# Patient Record
Sex: MALE | Race: WHITE | NOT HISPANIC OR LATINO | URBAN - METROPOLITAN AREA
[De-identification: names, ages, dates, MRNs, and addresses within clinical notes are randomized per-mention and may not be internally consistent; named-entity substitution may affect disease eponyms.]

---

## 2020-07-09 ENCOUNTER — OFFICE VISIT (OUTPATIENT)
Dept: URBAN - METROPOLITAN AREA CLINIC 53 | Facility: CLINIC | Age: 25
End: 2020-07-09

## 2020-09-24 ENCOUNTER — OFFICE VISIT (OUTPATIENT)
Dept: URBAN - NONMETROPOLITAN AREA CLINIC 2 | Facility: CLINIC | Age: 25
End: 2020-09-24

## 2020-10-08 ENCOUNTER — TELEPHONE ENCOUNTER (OUTPATIENT)
Dept: URBAN - NONMETROPOLITAN AREA CLINIC 2 | Facility: CLINIC | Age: 25
End: 2020-10-08

## 2020-10-08 RX ORDER — ACETAMINOPHEN 650 MG
2 TABLETS AS NEEDED TABLET, EXTENDED RELEASE ORAL
Qty: 10 | Refills: 0 | OUTPATIENT
Start: 2020-10-13 | End: 2020-10-14

## 2020-10-08 RX ORDER — INFLIXIMAB 100 MG/10ML
AS DIRECTED INJECTION, POWDER, LYOPHILIZED, FOR SOLUTION INTRAVENOUS
Qty: 1 | Refills: 0 | OUTPATIENT
Start: 2020-10-13 | End: 2020-10-14

## 2020-10-08 RX ORDER — DIPHENHYDRAMINE HCL 2 %
AS DIRECTED CREAM (GRAM) TOPICAL
Qty: 30 | Refills: 0 | OUTPATIENT
Start: 2020-10-13 | End: 2020-11-12

## 2020-10-15 ENCOUNTER — LAB OUTSIDE AN ENCOUNTER (OUTPATIENT)
Dept: URBAN - NONMETROPOLITAN AREA CLINIC 2 | Facility: CLINIC | Age: 25
End: 2020-10-15

## 2022-08-04 ENCOUNTER — OFFICE VISIT (OUTPATIENT)
Dept: URBAN - METROPOLITAN AREA TELEHEALTH 2 | Facility: TELEHEALTH | Age: 27
End: 2022-08-04

## 2022-08-04 VITALS — HEIGHT: 72 IN

## 2022-08-04 RX ORDER — INFLIXIMAB 100 MG/10ML
INJECTION, POWDER, LYOPHILIZED, FOR SOLUTION INTRAVENOUS
Qty: 0 | Refills: 0 | Status: ACTIVE | COMMUNITY
Start: 1900-01-01

## 2022-08-04 NOTE — HPI-TODAY'S VISIT:
5/3/2019 (Dr. Sharon Colunga):  Imtiaz presents for follow up of Crohn's disease with involvement of the terminal ileum and sigmoid colon in 2015 on biopsies with granulmas. He is stable on remicade 5 mg/kg every 8 weeks with no significant flares. He is due for labs. He has had no side effects of the medication. He has only had a few days of diarrhea that may be food related since his visit last year.  Today he is doing well. MB  3/26/20 (Dr. Sharon Colunga): Imtiaz presents for follow up of Crohn's disease. Since his last visit he has been doing great on remicade every 8 weeks with no flares. His bowels are moving regularly. He has recently picked up an URI after spring break and is pending COI

## 2023-05-26 ENCOUNTER — OFFICE VISIT (OUTPATIENT)
Dept: URBAN - METROPOLITAN AREA CLINIC 70 | Facility: CLINIC | Age: 28
End: 2023-05-26

## 2023-05-29 PROBLEM — 71833008: Status: ACTIVE | Noted: 2023-05-29

## 2023-05-30 ENCOUNTER — CLAIMS CREATED FROM THE CLAIM WINDOW (OUTPATIENT)
Dept: URBAN - METROPOLITAN AREA CLINIC 88 | Facility: CLINIC | Age: 28
End: 2023-05-30
Payer: COMMERCIAL

## 2023-05-30 ENCOUNTER — WEB ENCOUNTER (OUTPATIENT)
Dept: URBAN - METROPOLITAN AREA CLINIC 88 | Facility: CLINIC | Age: 28
End: 2023-05-30

## 2023-05-30 ENCOUNTER — OFFICE VISIT (OUTPATIENT)
Dept: URBAN - METROPOLITAN AREA CLINIC 88 | Facility: CLINIC | Age: 28
End: 2023-05-30

## 2023-05-30 ENCOUNTER — LAB OUTSIDE AN ENCOUNTER (OUTPATIENT)
Dept: URBAN - METROPOLITAN AREA CLINIC 88 | Facility: CLINIC | Age: 28
End: 2023-05-30

## 2023-05-30 VITALS
HEART RATE: 47 BPM | TEMPERATURE: 98.1 F | HEIGHT: 72 IN | WEIGHT: 166.8 LBS | DIASTOLIC BLOOD PRESSURE: 67 MMHG | SYSTOLIC BLOOD PRESSURE: 110 MMHG | BODY MASS INDEX: 22.59 KG/M2

## 2023-05-30 DIAGNOSIS — K59.01 CONSTIPATION: ICD-10-CM

## 2023-05-30 DIAGNOSIS — K50.80 CROHN'S DISEASE OF BOTH SMALL AND LARGE INTESTINE WITHOUT COMPLICATION: ICD-10-CM

## 2023-05-30 PROBLEM — 35298007: Status: ACTIVE | Noted: 2023-05-30

## 2023-05-30 PROCEDURE — 99204 OFFICE O/P NEW MOD 45 MIN: CPT | Performed by: NURSE PRACTITIONER

## 2023-05-30 RX ORDER — INFLIXIMAB 100 MG/10ML
INJECTION, POWDER, LYOPHILIZED, FOR SOLUTION INTRAVENOUS
Qty: 0 | Refills: 0 | Status: ACTIVE | COMMUNITY
Start: 1900-01-01

## 2023-05-30 RX ORDER — POLYETHYLENE GLYCOL 3350 17 G/DOSE
MIX ONE SCOOP IN AT LEAST 8 OZ OF WATER, TEA, COFFEE, JUICE POWDER (GRAM) ORAL
Qty: 510 GRAMS | Refills: 5 | OUTPATIENT
Start: 2023-05-30 | End: 2023-11-25

## 2023-05-30 NOTE — HPI-TODAY'S VISIT:
Patient with hx of small and large bowel Crohn's disease presents today to re-establish GI care with our practice.   Last evaluated by our group in 2020 (Dr. Winston Todd).  Was followed by Dr. Jose Pfeiffer in [facility] until recently due to insurance change.     Has been maintained on Remicade infusion at 10 mg/kg every 8 weeks for the last 6-7 years.  His last infusion was on 04/28/2023.  Over the last year has noticed intermittent mini-flares.  Voices mid-abdominal cramping, increased constipation and lower back pain.  Initially, flares would last for few days.  He would change his diet and symptoms would gradually resolve.   Over the last 2-3 weeks, noticed increased abdominal pain and lower back pain that lasted for several weeks.   Pain gradually resolved and currently rates pain as 1 out of 10.   Typically, defecation occurs once every flare.  During his flares, defecation may not occur for 2-3 days.  Fails to experience a complete sense of evacuation with every BM.   Father also presents who state that patient has been noncompliant with Remicade infusion for the last 1-2 years.  Patient admits to missing at least 6 months or more of infusion.  Has been consistently receiving his infusion for the last 8-10 months.   Also desiring clearance to proceed with surgical repair of right hydrocele.  Previous gastroenterologist did not clear patient due to concern of active IBD.   Last colonoscopy was in April 2023:  Mild erythema in sigmoid colon (benign), normal TI, small IH and poor prep.       Crohn's history:  Diagnosed with small and large bowel Crohn's as a freshman in college (2015).  Presented with complaints of nausea, loss of appetite, and weight loss.  Underwent colonoscopy and EGD in [place], [place].  Initially started on tapering steroids and oral mesalamine with little change in symptoms after 6 months of use.  Has also tried sulfasalazine and Imuran in the past.  Later switched to Remicade infusion at current rate of 10 mg/kg.  Last EGD in August 2015:  normal esophagus, moderate gastritis (neg HP), duodenal lesions (negative celiac, +chronic duodenitis)

## 2023-05-30 NOTE — HPI-OTHER HISTORIES
--------------------------------------------------------------------------------------------------- Last office note by Dr. Winston Todd 03/26/2020: 3/26/20 (Dr. Sharon Colunga): Imtiaz presents for follow up of Crohn's disease. Since his last visit he has been doing great on remicade every 8 weeks with no flares. His bowels are moving regularly. He has recently picked up an URI after spring break and is pending COI. 5/3/2019 (Dr. Sharon Colunga):  Imtiaz presents for follow up of Crohn's disease with involvement of the terminal ileum and sigmoid colon in 2015 on biopsies with granulmas. He is stable on remicade 5 mg/kg every 8 weeks with no significant flares. He is due for labs. He has had no side effects of the medication. He has only had a few days of diarrhea that may be food related since his visit last year.  Today he is doing well. MB

## 2023-06-08 ENCOUNTER — OFFICE VISIT (OUTPATIENT)
Dept: URBAN - METROPOLITAN AREA SURGERY CENTER 24 | Facility: SURGERY CENTER | Age: 28
End: 2023-06-08

## 2023-06-08 ENCOUNTER — CLAIMS CREATED FROM THE CLAIM WINDOW (OUTPATIENT)
Dept: URBAN - METROPOLITAN AREA SURGERY CENTER 24 | Facility: SURGERY CENTER | Age: 28
End: 2023-06-08
Payer: COMMERCIAL

## 2023-06-08 ENCOUNTER — CLAIMS CREATED FROM THE CLAIM WINDOW (OUTPATIENT)
Dept: URBAN - METROPOLITAN AREA CLINIC 4 | Facility: CLINIC | Age: 28
End: 2023-06-08
Payer: COMMERCIAL

## 2023-06-08 ENCOUNTER — TELEPHONE ENCOUNTER (OUTPATIENT)
Dept: URBAN - METROPOLITAN AREA CLINIC 70 | Facility: CLINIC | Age: 28
End: 2023-06-08

## 2023-06-08 DIAGNOSIS — K21.9 ACID REFLUX: ICD-10-CM

## 2023-06-08 DIAGNOSIS — K50.80 CROHN'S COLITIS: ICD-10-CM

## 2023-06-08 DIAGNOSIS — K31.89 OTHER DISEASES OF STOMACH AND DUODENUM: ICD-10-CM

## 2023-06-08 DIAGNOSIS — K31.89 ACQUIRED DEFORMITY OF DUODENUM: ICD-10-CM

## 2023-06-08 PROCEDURE — 88312 SPECIAL STAINS GROUP 1: CPT | Performed by: PATHOLOGY

## 2023-06-08 PROCEDURE — 88305 TISSUE EXAM BY PATHOLOGIST: CPT | Performed by: PATHOLOGY

## 2023-06-08 PROCEDURE — G8907 PT DOC NO EVENTS ON DISCHARG: HCPCS | Performed by: INTERNAL MEDICINE

## 2023-06-08 PROCEDURE — 43239 EGD BIOPSY SINGLE/MULTIPLE: CPT | Performed by: INTERNAL MEDICINE

## 2023-06-08 PROCEDURE — 45330 DIAGNOSTIC SIGMOIDOSCOPY: CPT | Performed by: INTERNAL MEDICINE

## 2023-06-08 RX ORDER — POLYETHYLENE GLYCOL 3350 17 G/DOSE
MIX ONE SCOOP IN AT LEAST 8 OZ OF WATER, TEA, COFFEE, JUICE POWDER (GRAM) ORAL
Qty: 510 GRAMS | Refills: 5 | Status: ACTIVE | COMMUNITY
Start: 2023-05-30 | End: 2023-11-25

## 2023-06-08 RX ORDER — INFLIXIMAB 100 MG/10ML
INJECTION, POWDER, LYOPHILIZED, FOR SOLUTION INTRAVENOUS
Qty: 0 | Refills: 0 | Status: ACTIVE | COMMUNITY
Start: 1900-01-01

## 2023-06-08 RX ORDER — PANTOPRAZOLE SODIUM 40 MG/1
1 TABLET TABLET, DELAYED RELEASE ORAL ONCE A DAY
Qty: 30 | OUTPATIENT
Start: 2023-06-08

## 2023-06-13 ENCOUNTER — CLAIMS CREATED FROM THE CLAIM WINDOW (OUTPATIENT)
Dept: URBAN - METROPOLITAN AREA CLINIC 4 | Facility: CLINIC | Age: 28
End: 2023-06-13
Payer: COMMERCIAL

## 2023-06-13 ENCOUNTER — OFFICE VISIT (OUTPATIENT)
Dept: URBAN - METROPOLITAN AREA SURGERY CENTER 24 | Facility: SURGERY CENTER | Age: 28
End: 2023-06-13
Payer: COMMERCIAL

## 2023-06-13 DIAGNOSIS — K50.80 CROHN'S COLITIS: ICD-10-CM

## 2023-06-13 DIAGNOSIS — K31.89 OTHER DISEASES OF STOMACH AND DUODENUM: ICD-10-CM

## 2023-06-13 DIAGNOSIS — K52.89 (LYMPHOCYTIC) MICROSCOPIC COLITIS: ICD-10-CM

## 2023-06-13 PROCEDURE — 45380 COLONOSCOPY AND BIOPSY: CPT | Performed by: INTERNAL MEDICINE

## 2023-06-13 PROCEDURE — G8907 PT DOC NO EVENTS ON DISCHARG: HCPCS | Performed by: INTERNAL MEDICINE

## 2023-06-13 PROCEDURE — 88305 TISSUE EXAM BY PATHOLOGIST: CPT | Performed by: PATHOLOGY

## 2023-06-16 ENCOUNTER — OFFICE VISIT (OUTPATIENT)
Dept: URBAN - METROPOLITAN AREA CLINIC 128 | Facility: CLINIC | Age: 28
End: 2023-06-16

## 2023-07-11 ENCOUNTER — LAB OUTSIDE AN ENCOUNTER (OUTPATIENT)
Dept: URBAN - METROPOLITAN AREA CLINIC 70 | Facility: CLINIC | Age: 28
End: 2023-07-11

## 2023-07-20 ENCOUNTER — TELEPHONE ENCOUNTER (OUTPATIENT)
Dept: URBAN - METROPOLITAN AREA CLINIC 70 | Facility: CLINIC | Age: 28
End: 2023-07-20

## 2023-07-20 ENCOUNTER — LAB OUTSIDE AN ENCOUNTER (OUTPATIENT)
Dept: URBAN - METROPOLITAN AREA CLINIC 70 | Facility: CLINIC | Age: 28
End: 2023-07-20

## 2023-07-20 LAB
ANTI-INFLIXIMAB ANTIBODY: 1874
C-REACTIVE PROTEIN, QUANT: 44
CALPROTECTIN, FECAL: 3040
INFLIXIMAB DRUG LEVEL: <0.4
Lab: (no result)
SEDIMENTATION RATE-WESTERGREN: 22

## 2023-08-07 ENCOUNTER — TELEPHONE ENCOUNTER (OUTPATIENT)
Dept: URBAN - METROPOLITAN AREA CLINIC 70 | Facility: CLINIC | Age: 28
End: 2023-08-07

## 2023-08-18 ENCOUNTER — OFFICE VISIT (OUTPATIENT)
Dept: URBAN - METROPOLITAN AREA CLINIC 88 | Facility: CLINIC | Age: 28
End: 2023-08-18

## 2023-08-28 ENCOUNTER — TELEPHONE ENCOUNTER (OUTPATIENT)
Dept: URBAN - METROPOLITAN AREA CLINIC 70 | Facility: CLINIC | Age: 28
End: 2023-08-28

## 2023-08-28 PROBLEM — 235595009: Status: ACTIVE | Noted: 2023-08-28

## 2023-08-28 RX ORDER — PREDNISONE 10 MG/1
TAKE 4 TABLETS ONCE A DAY X 14 DAYS, THEN 3 TABLETS A DAY X 7 DAYS, 2 TABLET A DAY X 7 DAYS, THEN 1 TABLET DAILY X 7 DAYS TABLET ORAL ONCE A DAY
Qty: 98 | Refills: 0 | OUTPATIENT
Start: 2023-08-28 | End: 2023-09-27

## 2023-08-28 RX ORDER — POLYETHYLENE GLYCOL 3350 17 G/DOSE
MIX ONE SCOOP IN AT LEAST 8 OZ OF WATER, TEA, COFFEE, JUICE POWDER (GRAM) ORAL
Qty: 510 GRAMS | Refills: 5 | Status: ACTIVE | COMMUNITY
Start: 2023-05-30 | End: 2023-11-25

## 2023-08-28 RX ORDER — PANTOPRAZOLE SODIUM 40 MG/1
1 TABLET TABLET, DELAYED RELEASE ORAL
Qty: 90 | Refills: 1 | OUTPATIENT
Start: 2023-08-28

## 2023-08-28 RX ORDER — INFLIXIMAB 100 MG/10ML
INJECTION, POWDER, LYOPHILIZED, FOR SOLUTION INTRAVENOUS
Qty: 0 | Refills: 0 | Status: ACTIVE | COMMUNITY
Start: 1900-01-01

## 2023-08-28 RX ORDER — PANTOPRAZOLE SODIUM 40 MG/1
1 TABLET TABLET, DELAYED RELEASE ORAL ONCE A DAY
Qty: 30 | Status: ACTIVE | COMMUNITY
Start: 2023-06-08

## 2023-08-28 RX ORDER — METRONIDAZOLE 500 MG/1
1 TABLET TABLET ORAL THREE TIMES A DAY
Qty: 30 TABLET | Refills: 0 | OUTPATIENT
Start: 2023-08-28 | End: 2023-09-07

## 2023-09-08 ENCOUNTER — OFFICE VISIT (OUTPATIENT)
Dept: URBAN - METROPOLITAN AREA CLINIC 88 | Facility: CLINIC | Age: 28
End: 2023-09-08
Payer: COMMERCIAL

## 2023-09-08 ENCOUNTER — LAB OUTSIDE AN ENCOUNTER (OUTPATIENT)
Dept: URBAN - METROPOLITAN AREA CLINIC 88 | Facility: CLINIC | Age: 28
End: 2023-09-08

## 2023-09-08 ENCOUNTER — WEB ENCOUNTER (OUTPATIENT)
Dept: URBAN - METROPOLITAN AREA CLINIC 88 | Facility: CLINIC | Age: 28
End: 2023-09-08

## 2023-09-08 VITALS
HEIGHT: 72 IN | OXYGEN SATURATION: 99 % | DIASTOLIC BLOOD PRESSURE: 78 MMHG | HEART RATE: 64 BPM | WEIGHT: 164 LBS | BODY MASS INDEX: 22.21 KG/M2 | TEMPERATURE: 97.9 F | SYSTOLIC BLOOD PRESSURE: 122 MMHG

## 2023-09-08 DIAGNOSIS — K50.80 CROHN'S DISEASE OF BOTH SMALL AND LARGE INTESTINE WITHOUT COMPLICATION: ICD-10-CM

## 2023-09-08 DIAGNOSIS — R19.7 DIARRHEA OF PRESUMED INFECTIOUS ORIGIN: ICD-10-CM

## 2023-09-08 DIAGNOSIS — K21.9 GASTROESOPHAGEAL REFLUX DISEASE, UNSPECIFIED WHETHER ESOPHAGITIS PRESENT: ICD-10-CM

## 2023-09-08 PROCEDURE — 99214 OFFICE O/P EST MOD 30 MIN: CPT | Performed by: NURSE PRACTITIONER

## 2023-09-08 RX ORDER — USTEKINUMAB 90 MG/ML
INJECT 1 PEN INJECTION, SOLUTION SUBCUTANEOUS
Qty: 1 | Refills: 6 | OUTPATIENT
Start: 2023-09-08

## 2023-09-08 RX ORDER — PREDNISONE 10 MG/1
TAKE 4 TABLETS ONCE A DAY X 14 DAYS, THEN 3 TABLETS A DAY X 7 DAYS, 2 TABLET A DAY X 7 DAYS, THEN 1 TABLET DAILY X 7 DAYS TABLET ORAL ONCE A DAY
Qty: 98 | Refills: 0 | Status: ACTIVE | COMMUNITY
Start: 2023-08-28 | End: 2023-09-27

## 2023-09-08 RX ORDER — INFLIXIMAB 100 MG/10ML
INJECTION, POWDER, LYOPHILIZED, FOR SOLUTION INTRAVENOUS
Qty: 0 | Refills: 0 | Status: DISCONTINUED | COMMUNITY
Start: 1900-01-01

## 2023-09-08 RX ORDER — PREDNISONE 10 MG/1
TAKE 4 TABLETS ONCE A DAY X 14 DAYS, THEN 3 TABLETS A DAY X 7 DAYS, 2 TABLET A DAY X 7 DAYS, THEN 1 TABLET DAILY X 7 DAYS TABLET ORAL ONCE A DAY
OUTPATIENT
Start: 2023-08-28

## 2023-09-08 RX ORDER — ACETAMINOPHEN 650 MG
2 TABLETS AS NEEDED TABLET, EXTENDED RELEASE ORAL
Qty: 6 TABLET | Refills: 0 | OUTPATIENT
Start: 2023-09-08 | End: 2023-09-09

## 2023-09-08 RX ORDER — PANTOPRAZOLE SODIUM 40 MG/1
1 TABLET TABLET, DELAYED RELEASE ORAL ONCE A DAY
Qty: 30 | Status: DISCONTINUED | COMMUNITY
Start: 2023-06-08

## 2023-09-08 RX ORDER — PANTOPRAZOLE SODIUM 40 MG/1
1 TABLET TABLET, DELAYED RELEASE ORAL
Qty: 90 | Refills: 1 | Status: ACTIVE | COMMUNITY
Start: 2023-08-28

## 2023-09-08 RX ORDER — PANTOPRAZOLE SODIUM 40 MG/1
1 TABLET TABLET, DELAYED RELEASE ORAL
Qty: 90 | Refills: 1 | OUTPATIENT

## 2023-09-08 RX ORDER — POLYETHYLENE GLYCOL 3350 17 G/DOSE
MIX ONE SCOOP IN AT LEAST 8 OZ OF WATER, TEA, COFFEE, JUICE POWDER (GRAM) ORAL
Qty: 510 GRAMS | Refills: 5 | Status: DISCONTINUED | COMMUNITY
Start: 2023-05-30 | End: 2023-11-25

## 2023-09-08 NOTE — HPI-OTHER HISTORIES
------------------------------------------------------------------------------------- Last office note 05/30/2023: Patient with hx of small and large bowel Crohn's disease presents today to re-establish GI care with our practice.   Last evaluated by our group in 2020 (Dr. Winston Todd).  Was followed by Dr. Jose Pfeiffer in [facility] until recently due to insurance change.     Has been maintained on Remicade infusion at 10 mg/kg every 8 weeks for the last 6-7 years.  His last infusion was on 04/28/2023.  Over the last year has noticed intermittent mini-flares.  Voices mid-abdominal cramping, increased constipation and lower back pain.  Initially, flares would last for few days.  He would change his diet and symptoms would gradually resolve.   Over the last 2-3 weeks, noticed increased abdominal pain and lower back pain that lasted for several weeks.   Pain gradually resolved and currently rates pain as 1 out of 10.   Typically, defecation occurs once every flare.  During his flares, defecation may not occur for 2-3 days.  Fails to experience a complete sense of evacuation with every BM.   Father also presents who state that patient has been noncompliant with Remicade infusion for the last 1-2 years.  Patient admits to missing at least 6 months or more of infusion.  Has been consistently receiving his infusion for the last 8-10 months.   Also desiring clearance to proceed with surgical repair of right hydrocele.  Previous gastroenterologist did not clear patient due to concern of active IBD.   Last colonoscopy was in April 2023:  Mild erythema in sigmoid colon (benign), normal TI, small IH and poor prep.       Crohn's history:  Diagnosed with small and large bowel Crohn's as a freshman in college (2015).  Presented with complaints of nausea, loss of appetite, and weight loss.  Underwent colonoscopy and EGD in [place], [place].  Initially started on tapering steroids and oral mesalamine with little change in symptoms after 6 months of use.  Has also tried sulfasalazine and Imuran in the past.  Later switched to Remicade infusion at current rate of 10 mg/kg.  Last EGD in August 2015:  normal esophagus, moderate gastritis (neg HP), duodenal lesions (negative celiac, +chronic duodenitis).

## 2023-09-08 NOTE — HPI-TODAY'S VISIT:
Patient presents today for follow up regarding Crohn's.  Labs reviewed and discussed with patient.   Currently on tapering dose of steroid.  Since starting this, voices improvement in stool consistency.  Defecation occurs 1-2 times a day.  Also reports decrease in abdominal pain and fatigue.  Patient's states last dose of Remicade infusion was in April 2023.    CTE on 08/18/2023:  active inflammation with mild narrowing of 3 cm segment of TI, active inflammation with multiple skip lesions in distal, mid and proximal ileum/distal jejunum without signs of high grade obstruction, large left and moderate right hydrocele. Previously followed by Dr. Jose Pfeiffer (gastroenterologist in [facility]) until recently due to insurance change.    Last colonoscopy was in April 2023:  Mild erythema in sigmoid colon (benign), normal TI, small IH and poor prep.    Desiring to proceed with alternative therapy to manage Crohn's disease.  Patient recently started a new job that may require remote vs traveling nationwide assignments.   Crohn's history: Diagnosed with small and large bowel Crohn's as a freshman in college (2015). Presented with complaints of nausea, loss of appetite, and weight loss. Underwent colonoscopy and EGD in [place], [place]. Initially started on tapering steroids and oral mesalamine with little change in symptoms after 6 months of use. Has also tried sulfasalazine and Imuran in the past. Later switched to Remicade infusion at current rate of 10 mg/kg. Last EGD in August 2015: normal esophagus, moderate gastritis (neg HP), duodenal lesions (negative celiac, +chronic duodenitis).

## 2023-09-11 ENCOUNTER — TELEPHONE ENCOUNTER (OUTPATIENT)
Dept: URBAN - METROPOLITAN AREA CLINIC 70 | Facility: CLINIC | Age: 28
End: 2023-09-11

## 2023-09-13 ENCOUNTER — TELEPHONE ENCOUNTER (OUTPATIENT)
Dept: URBAN - METROPOLITAN AREA CLINIC 70 | Facility: CLINIC | Age: 28
End: 2023-09-13

## 2023-09-20 ENCOUNTER — TELEPHONE ENCOUNTER (OUTPATIENT)
Dept: URBAN - METROPOLITAN AREA CLINIC 70 | Facility: CLINIC | Age: 28
End: 2023-09-20

## 2023-09-21 ENCOUNTER — LAB OUTSIDE AN ENCOUNTER (OUTPATIENT)
Dept: URBAN - METROPOLITAN AREA CLINIC 88 | Facility: CLINIC | Age: 28
End: 2023-09-21

## 2023-09-22 ENCOUNTER — TELEPHONE ENCOUNTER (OUTPATIENT)
Dept: URBAN - METROPOLITAN AREA CLINIC 117 | Facility: CLINIC | Age: 28
End: 2023-09-22

## 2023-10-16 ENCOUNTER — TELEPHONE ENCOUNTER (OUTPATIENT)
Dept: URBAN - METROPOLITAN AREA CLINIC 70 | Facility: CLINIC | Age: 28
End: 2023-10-16

## 2023-10-16 LAB
A/G RATIO: 1.5
ABSOLUTE BASOPHILS: 41
ABSOLUTE EOSINOPHILS: 12
ABSOLUTE LYMPHOCYTES: 1061
ABSOLUTE MONOCYTES: 539
ABSOLUTE NEUTROPHILS: 4147
ALBUMIN: 3.7
ALKALINE PHOSPHATASE: 54
ALT (SGPT): 5
AST (SGOT): 10
BASOPHILS: 0.7
BILIRUBIN, TOTAL: 0.4
BUN/CREATININE RATIO: (no result)
BUN: 7
CALCIUM: 8.9
CARBON DIOXIDE, TOTAL: 22
CHLORIDE: 106
CREATININE: 1.06
EGFR: 99
EOSINOPHILS: 0.2
GLOBULIN, TOTAL: 2.5
GLUCOSE: 93
HBSAG SCREEN: (no result)
HEMATOCRIT: 38.7
HEMOGLOBIN: 12.8
HEP A AB, IGM: (no result)
HEP B CORE AB, IGM: (no result)
HEPATITIS C ANTIBODY: (no result)
LYMPHOCYTES: 18.3
MCH: 28.4
MCHC: 33.1
MCV: 86
MITOGEN-NIL: >10
MONOCYTES: 9.3
MPV: 11.1
NEUTROPHILS: 71.5
PLATELET COUNT: 320
POTASSIUM: 3.2
PROTEIN, TOTAL: 6.2
QUANTIFERON NIL VALUE: 0.06
QUANTIFERON TB1 AG VALUE: 0.01
QUANTIFERON TB2 AG VALUE: 0.01
QUANTIFERON-TB GOLD PLUS: NEGATIVE
RDW: 15.3
RED BLOOD CELL COUNT: 4.5
SODIUM: 140
WHITE BLOOD CELL COUNT: 5.8

## 2023-10-23 ENCOUNTER — OFFICE VISIT (OUTPATIENT)
Dept: URBAN - METROPOLITAN AREA CLINIC 18 | Facility: CLINIC | Age: 28
End: 2023-10-23
Payer: COMMERCIAL

## 2023-10-23 ENCOUNTER — TELEPHONE ENCOUNTER (OUTPATIENT)
Dept: URBAN - METROPOLITAN AREA CLINIC 18 | Facility: CLINIC | Age: 28
End: 2023-10-23

## 2023-10-23 VITALS
DIASTOLIC BLOOD PRESSURE: 65 MMHG | TEMPERATURE: 98 F | SYSTOLIC BLOOD PRESSURE: 129 MMHG | WEIGHT: 160 LBS | BODY MASS INDEX: 21.67 KG/M2 | RESPIRATION RATE: 18 BRPM | HEIGHT: 72 IN | HEART RATE: 60 BPM

## 2023-10-23 DIAGNOSIS — K50.80 CROHN'S DISEASE OF BOTH SMALL AND LARGE INTESTINE WITHOUT COMPLICATION: ICD-10-CM

## 2023-10-23 PROCEDURE — 96413 CHEMO IV INFUSION 1 HR: CPT | Performed by: INTERNAL MEDICINE

## 2023-10-23 RX ORDER — USTEKINUMAB 90 MG/ML
INJECT 1 PEN INJECTION, SOLUTION SUBCUTANEOUS
Qty: 1 | Refills: 6 | Status: ACTIVE | COMMUNITY
Start: 2023-09-08

## 2023-10-23 RX ORDER — PREDNISONE 10 MG/1
TAKE 4 TABLETS ONCE A DAY X 14 DAYS, THEN 3 TABLETS A DAY X 7 DAYS, 2 TABLET A DAY X 7 DAYS, THEN 1 TABLET DAILY X 7 DAYS TABLET ORAL ONCE A DAY
Status: ACTIVE | COMMUNITY
Start: 2023-08-28

## 2023-10-23 RX ORDER — PANTOPRAZOLE SODIUM 40 MG/1
1 TABLET TABLET, DELAYED RELEASE ORAL
Qty: 90 | Refills: 1 | Status: ACTIVE | COMMUNITY

## 2023-10-26 ENCOUNTER — TELEPHONE ENCOUNTER (OUTPATIENT)
Dept: URBAN - METROPOLITAN AREA CLINIC 88 | Facility: CLINIC | Age: 28
End: 2023-10-26

## 2023-11-03 ENCOUNTER — OFFICE VISIT (OUTPATIENT)
Dept: URBAN - METROPOLITAN AREA CLINIC 88 | Facility: CLINIC | Age: 28
End: 2023-11-03

## 2023-11-03 RX ORDER — USTEKINUMAB 90 MG/ML
INJECT 1 PEN INJECTION, SOLUTION SUBCUTANEOUS
Qty: 1 | Refills: 6 | Status: ACTIVE | COMMUNITY
Start: 2023-09-08

## 2023-11-03 RX ORDER — PANTOPRAZOLE SODIUM 40 MG/1
1 TABLET TABLET, DELAYED RELEASE ORAL
Qty: 90 | Refills: 1 | Status: ACTIVE | COMMUNITY

## 2023-11-03 RX ORDER — PREDNISONE 10 MG/1
TAKE 4 TABLETS ONCE A DAY X 14 DAYS, THEN 3 TABLETS A DAY X 7 DAYS, 2 TABLET A DAY X 7 DAYS, THEN 1 TABLET DAILY X 7 DAYS TABLET ORAL ONCE A DAY
Status: ACTIVE | COMMUNITY
Start: 2023-08-28

## 2023-11-03 NOTE — HPI-OTHER HISTORIES
-------------------------------------------------------------- Last office note 09/08/2023: Patient presents today for follow up regarding Crohn's. Labs reviewed and discussed with patient. Currently on tapering dose of steroid. Since starting this, voices improvement in stool consistency. Defecation occurs 1-2 times a day. Also reports decrease in abdominal pain and fatigue. Patient's states last dose of Remicade infusion was in April 2023.  CTE on 08/18/2023: active inflammation with mild narrowing of 3 cm segment of TI, active inflammation with multiple skip lesions in distal, mid and proximal ileum/distal jejunum without signs of high grade obstruction, large left and moderate right hydrocele. Previously followed by Dr. Jose Pfeiffer (gastroenterologist in [facility]) until recently due to insurance change. Last colonoscopy was in April 2023: Mild erythema in sigmoid colon (benign), normal TI, small IH and poor prep.  Desiring to proceed with alternative therapy to manage Crohn's disease. Patient recently started a new job that may require remote vs traveling nationwide assignments.  Crohn's history: Diagnosed with small and large bowel Crohn's as a freshman in college (2015). Presented with complaints of nausea, loss of appetite, and weight loss. Underwent colonoscopy and EGD in [place], [place]. Initially started on tapering steroids and oral mesalamine with little change in symptoms after 6 months of use. Has also tried sulfasalazine and Imuran in the past. Later switched to Remicade infusion at current rate of 10 mg/kg. Last EGD in August 2015: normal esophagus, moderate gastritis (neg HP), duodenal lesions (negative celiac, +chronic duodenitis).

## 2023-11-03 NOTE — HPI-TODAY'S VISIT:
Patient with hx of Crohn's disease presenting for follow up.  Received induction infustion of Stelara on 10/23/2023.    CTE on 08/18/2023: active inflammation with mild narrowing of 3 cm segment of TI, active inflammation with multiple skip lesions in distal, mid and proximal ileum/distal jejunum without signs of high grade obstruction, large left and moderate right hydrocele. Previously followed by Dr. Jose Pfeiffer (gastroenterologist in [facility]) until recently due to insurance change. Last colonoscopy was in April 2023: Mild erythema in sigmoid colon (benign), normal TI, small IH and poor prep.  Crohn's history: Diagnosed with small and large bowel Crohn's as a freshman in college (2015). Presented with complaints of nausea, loss of appetite, and weight loss. Underwent colonoscopy and EGD in [place], [place]. Initially started on tapering steroids and oral mesalamine with little change in symptoms after 6 months of use. Has also tried sulfasalazine and Imuran in the past. Later switched to Remicade infusion at current rate of 10 mg/kg. Last EGD in August 2015: normal esophagus, moderate gastritis (neg HP), duodenal lesions (negative celiac, +chronic duodenitis).

## 2023-11-16 ENCOUNTER — CLAIMS CREATED FROM THE CLAIM WINDOW (OUTPATIENT)
Dept: URBAN - METROPOLITAN AREA CLINIC 88 | Facility: CLINIC | Age: 28
End: 2023-11-16
Payer: COMMERCIAL

## 2023-11-16 VITALS
WEIGHT: 165 LBS | BODY MASS INDEX: 22.35 KG/M2 | OXYGEN SATURATION: 99 % | HEART RATE: 52 BPM | TEMPERATURE: 96.3 F | DIASTOLIC BLOOD PRESSURE: 63 MMHG | HEIGHT: 72 IN | SYSTOLIC BLOOD PRESSURE: 108 MMHG

## 2023-11-16 DIAGNOSIS — K21.9 GASTROESOPHAGEAL REFLUX DISEASE, UNSPECIFIED WHETHER ESOPHAGITIS PRESENT: ICD-10-CM

## 2023-11-16 DIAGNOSIS — K50.80 CROHN'S DISEASE OF BOTH SMALL AND LARGE INTESTINE WITHOUT COMPLICATION: ICD-10-CM

## 2023-11-16 PROCEDURE — 99214 OFFICE O/P EST MOD 30 MIN: CPT | Performed by: NURSE PRACTITIONER

## 2023-11-16 RX ORDER — PANTOPRAZOLE SODIUM 40 MG/1
1 TABLET TABLET, DELAYED RELEASE ORAL
Qty: 90 | Refills: 1 | Status: ACTIVE | COMMUNITY

## 2023-11-16 RX ORDER — PANTOPRAZOLE SODIUM 40 MG/1
1 TABLET TABLET, DELAYED RELEASE ORAL
OUTPATIENT

## 2023-11-16 RX ORDER — USTEKINUMAB 90 MG/ML
INJECT 1 PEN INJECTION, SOLUTION SUBCUTANEOUS
Qty: 1 | Refills: 6 | Status: ACTIVE | COMMUNITY
Start: 2023-09-08

## 2023-11-16 RX ORDER — PREDNISONE 10 MG/1
TAKE 4 TABLETS ONCE A DAY X 14 DAYS, THEN 3 TABLETS A DAY X 7 DAYS, 2 TABLET A DAY X 7 DAYS, THEN 1 TABLET DAILY X 7 DAYS TABLET ORAL ONCE A DAY
Status: DISCONTINUED | COMMUNITY
Start: 2023-08-28

## 2023-11-16 RX ORDER — USTEKINUMAB 90 MG/ML
INJECT 1 PEN INJECTION, SOLUTION SUBCUTANEOUS
OUTPATIENT
Start: 2023-09-08

## 2023-11-16 NOTE — HPI-TODAY'S VISIT:
Patient with hx of Crohn's disease presenting for follow up.  Received induction infustion of Stelara on 10/23/2023.   Has noticed slight improvement since infusion (was on tapering steroid prior to induction infusion).   Denies abd pain, nausea, vomiting or diarrhea.  Defecation occurs once a day.  Weight remains stable and describes appetite as being excellent.  Has questions about proceeding with inguinal hernia repair after starting treatment for Crohn's disease.   Currently take pantoprazole as needed.  Experiences occasional LUQ, epigastric discomfort after eating.  Denies this being a daily complaint.    CTE on 08/18/2023: active inflammation with mild narrowing of 3 cm segment of TI, active inflammation with multiple skip lesions in distal, mid and proximal ileum/distal jejunum without signs of high grade obstruction, large left and moderate right hydrocele. Previously followed by Dr. Jose Pfeiffer (gastroenterologist in [facility]) until recently due to insurance change.  Colonoscopy was in April 2023: Mild erythema in sigmoid colon (benign), normal TI, small IH and poor prep. Colonoscopy on 06/13/2023:  Normal TI, mild erythema in sigmoid colon.  Biopsies from right and transverse colon were benign.  Left colon biopsies + for patchy active colitis.  EGD:  Reflux esophagitis (neg Cochran's), erosive gastritis (neg HP), normal duodenum (benign)  Crohn's history: Diagnosed with small and large bowel Crohn's as a freshman in college (2015). Presented with complaints of nausea, loss of appetite, and weight loss. Underwent colonoscopy and EGD in [place], [place]. Initially started on tapering steroids and oral mesalamine with little change in symptoms after 6 months of use. Has also tried sulfasalazine and Imuran in the past. Later switched to Remicade infusion at current rate of 10 mg/kg. Last EGD in August 2015: normal esophagus, moderate gastritis (neg HP), duodenal lesions (negative celiac, +chronic duodenitis).

## 2023-11-16 NOTE — PHYSICAL EXAM GASTROINTESTINAL
Abdomen , soft, RUQ, epigastric, LUQ tenderness, nondistended , no guarding or rigidity , no masses palpable , normal bowel sounds , Liver and Spleen:  no hepatosplenomegaly

## 2023-11-16 NOTE — HPI-OTHER HISTORIES
-------------------------------------------------------------- Last office note 09/08/2023: Patient presents today for follow up regarding Crohn's. Labs reviewed and discussed with patient. Currently on tapering dose of steroid. Since starting this, voices improvement in stool consistency. Defecation occurs 1-2 times a day. Also reports decrease in abdominal pain and fatigue. Patient's states last dose of Remicade infusion was in April 2023.  CTE on 08/18/2023: active inflammation with mild narrowing of 3 cm segment of TI, active inflammation with multiple skip lesions in distal, mid and proximal ileum/distal jejunum without signs of high grade obstruction, large left and moderate right hydrocele. Previously followed by Dr. Jose Pfeiffer (gastroenterologist in [facility]) until recently due to insurance change. Last colonoscopy was in April 2023: Mild erythema in sigmoid colon (benign), normal TI, small IH and poor prep.  Desiring to proceed with alternative therapy to manage Crohn's disease. Patient recently started a new job that may require remote vs traveling nationwide assignments.  Crohn's history: Diagnosed with small and large bowel Crohn's as a freshman in college (2015). Presented with complaints of nausea, loss of appetite, and weight loss. Underwent colonoscopy and EGD in [place], [place]. Initially started on tapering steroids and oral mesalamine with little change in symptoms after 6 months of use. Has also tried sulfasalazine and Imuran in the past. Later switched to Remicade infusion at current rate of 10 mg/kg. Last EGD in August 2015: normal esophagus, moderate gastritis (neg HP), duodenal lesions (negative celiac, +chronic duodenitis).

## 2023-12-18 ENCOUNTER — LAB OUTSIDE AN ENCOUNTER (OUTPATIENT)
Dept: URBAN - METROPOLITAN AREA CLINIC 70 | Facility: CLINIC | Age: 28
End: 2023-12-18

## 2023-12-26 ENCOUNTER — TELEPHONE ENCOUNTER (OUTPATIENT)
Dept: URBAN - METROPOLITAN AREA CLINIC 70 | Facility: CLINIC | Age: 28
End: 2023-12-26

## 2023-12-27 LAB
A/G RATIO: 1.6
ALBUMIN: 3.5
ALKALINE PHOSPHATASE: 54
ALT (SGPT): 11
ANTI-USTEKINUMAB ANTIBODY: <40
AST (SGOT): 21
BILIRUBIN, TOTAL: 0.2
BUN/CREATININE RATIO: 11
BUN: 11
C-REACTIVE PROTEIN, QUANT: 35
CALCIUM: 8.9
CALPROTECTIN, FECAL: 4130
CARBON DIOXIDE, TOTAL: 20
CHLORIDE: 102
CREATININE: 0.99
EGFR: 107
GLOBULIN, TOTAL: 2.2
GLUCOSE: 89
HEMATOCRIT: 41.3
HEMOGLOBIN: 13.3
Lab: (no result)
MCH: 29.4
MCHC: 32.2
MCV: 91
NRBC: (no result)
PLATELETS: 346
POTASSIUM: 4.5
PROTEIN, TOTAL: 5.7
RBC: 4.53
RDW: 13.5
SEDIMENTATION RATE-WESTERGREN: 27
SODIUM: 134
USTEKINUMAB: 1.1
WBC: 5.9

## 2024-01-04 ENCOUNTER — TELEPHONE ENCOUNTER (OUTPATIENT)
Dept: URBAN - METROPOLITAN AREA CLINIC 70 | Facility: CLINIC | Age: 29
End: 2024-01-04

## 2024-02-16 ENCOUNTER — OV EP (OUTPATIENT)
Dept: URBAN - METROPOLITAN AREA CLINIC 88 | Facility: CLINIC | Age: 29
End: 2024-02-16

## 2024-02-16 RX ORDER — USTEKINUMAB 90 MG/ML
INJECT 1 PEN INJECTION, SOLUTION SUBCUTANEOUS
OUTPATIENT

## 2024-02-16 RX ORDER — PANTOPRAZOLE SODIUM 40 MG/1
1 TABLET TABLET, DELAYED RELEASE ORAL
Status: ACTIVE | COMMUNITY

## 2024-02-16 RX ORDER — PANTOPRAZOLE SODIUM 40 MG/1
1 TABLET TABLET, DELAYED RELEASE ORAL
OUTPATIENT

## 2024-02-16 RX ORDER — USTEKINUMAB 90 MG/ML
INJECT 1 PEN INJECTION, SOLUTION SUBCUTANEOUS
Status: ACTIVE | COMMUNITY
Start: 2023-09-08

## 2024-02-16 NOTE — HPI-OTHER HISTORIES
------------------------------------------------------------------------------------ Last office note 11/16/2023: Patient with hx of Crohn's disease presenting for follow up.  Received induction infustion of Stelara on 10/23/2023.   Has noticed slight improvement since infusion (was on tapering steroid prior to induction infusion).   Denies abd pain, nausea, vomiting or diarrhea.  Defecation occurs once a day.  Weight remains stable and describes appetite as being excellent.  Has questions about proceeding with inguinal hernia repair after starting treatment for Crohn's disease.   Currently take pantoprazole as needed.  Experiences occasional LUQ, epigastric discomfort after eating.  Denies this being a daily complaint.    CTE on 08/18/2023: active inflammation with mild narrowing of 3 cm segment of TI, active inflammation with multiple skip lesions in distal, mid and proximal ileum/distal jejunum without signs of high grade obstruction, large left and moderate right hydrocele. Previously followed by Dr. Jose Pfeiffer (gastroenterologist in [facility]) until recently due to insurance change.  Colonoscopy was in April 2023: Mild erythema in sigmoid colon (benign), normal TI, small IH and poor prep. Colonoscopy on 06/13/2023:  Normal TI, mild erythema in sigmoid colon.  Biopsies from right and transverse colon were benign.  Left colon biopsies + for patchy active colitis.  EGD:  Reflux esophagitis (neg Cochran's), erosive gastritis (neg HP), normal duodenum (benign)  Crohn's history: Diagnosed with small and large bowel Crohn's as a freshman in college (2015). Presented with complaints of nausea, loss of appetite, and weight loss. Underwent colonoscopy and EGD in [place], [place]. Initially started on tapering steroids and oral mesalamine with little change in symptoms after 6 months of use. Has also tried sulfasalazine and Imuran in the past. Later switched to Remicade infusion at current rate of 10 mg/kg. Last EGD in August 2015: normal esophagus, moderate gastritis (neg HP), duodenal lesions (negative celiac, +chronic duodenitis).

## 2024-03-01 ENCOUNTER — OV EP (OUTPATIENT)
Dept: URBAN - METROPOLITAN AREA CLINIC 88 | Facility: CLINIC | Age: 29
End: 2024-03-01
Payer: COMMERCIAL

## 2024-03-01 VITALS
DIASTOLIC BLOOD PRESSURE: 72 MMHG | WEIGHT: 170.4 LBS | HEART RATE: 73 BPM | BODY MASS INDEX: 23.08 KG/M2 | SYSTOLIC BLOOD PRESSURE: 121 MMHG | TEMPERATURE: 97.1 F | HEIGHT: 72 IN

## 2024-03-01 DIAGNOSIS — K50.80 CROHN'S DISEASE OF BOTH SMALL AND LARGE INTESTINE WITHOUT COMPLICATION: ICD-10-CM

## 2024-03-01 DIAGNOSIS — K21.9 GASTROESOPHAGEAL REFLUX DISEASE, UNSPECIFIED WHETHER ESOPHAGITIS PRESENT: ICD-10-CM

## 2024-03-01 PROCEDURE — 99214 OFFICE O/P EST MOD 30 MIN: CPT | Performed by: NURSE PRACTITIONER

## 2024-03-01 RX ORDER — USTEKINUMAB 90 MG/ML
INJECT 1 PEN INJECTION, SOLUTION SUBCUTANEOUS
Status: ACTIVE | COMMUNITY

## 2024-03-01 RX ORDER — PANTOPRAZOLE SODIUM 40 MG/1
1 TABLET TABLET, DELAYED RELEASE ORAL
OUTPATIENT

## 2024-03-01 RX ORDER — USTEKINUMAB 90 MG/ML
INJECT 1 PEN INJECTION, SOLUTION SUBCUTANEOUS
OUTPATIENT

## 2024-03-01 RX ORDER — PANTOPRAZOLE SODIUM 40 MG/1
1 TABLET TABLET, DELAYED RELEASE ORAL
Status: ACTIVE | COMMUNITY

## 2024-03-01 NOTE — HPI-TODAY'S VISIT:
28 y.o. with hx of Crohn's presenting for follow up.  Currently, on Stelara every 8 weeks (started October 2023). Despite taking medication, his inflammatory markers remain elevated.  Continues to voice intermittent to constant pain to RLQ, especially after consuming larger meals.  Massaging this area may relieve this pressure of sensation of blockage to this region. Defecation occurs about 2x a day, loose in consistency with urgency.  Denies fecal incontinence or nocturnal urgency. Eating "clean" leads to less abdominal discomfort.  Weight loss remains an issue as well.  Last dose of Stelara around 02/16/2024.    CTE on 08/18/2023: active inflammation with mild narrowing of 3 cm segment of TI, active inflammation with multiple skip lesions in distal, mid and proximal ileum/distal jejunum without signs of high grade obstruction, large left and moderate right hydrocele. Previously followed by Dr. Jose Pfeiffer (gastroenterologist in [facility]) until recently due to insurance change.  Colonoscopy was in April 2023: Mild erythema in sigmoid colon (benign), normal TI, small IH and poor prep. Colonoscopy on 06/13/2023:  Normal TI, mild erythema in sigmoid colon.  Biopsies from right and transverse colon were benign.  Left colon biopsies + for patchy active colitis.  EGD:  Reflux esophagitis (neg Cochran's), erosive gastritis (neg HP), normal duodenum (benign)  Crohn's history: Diagnosed with small and large bowel Crohn's as a freshman in college (2015). Presented with complaints of nausea, loss of appetite, and weight loss. Underwent colonoscopy and EGD in [place], [place]. Initially started on tapering steroids and oral mesalamine with little change in symptoms after 6 months of use. Has also tried sulfasalazine and Imuran in the past. Later switched to Remicade infusion at current rate of 10 mg/kg. Last EGD in August 2015: normal esophagus, moderate gastritis (neg HP), duodenal lesions (negative celiac, +chronic duodenitis).

## 2024-03-01 NOTE — HPI-OTHER HISTORIES
------------------------------------------------------------------------------------ Last office note 11/16/2023: Patient with hx of Crohn's disease presenting for follow up.  Received induction infustion of Stelara on 10/23/2023.   Has noticed slight improvement since infusion (was on tapering steroid prior to induction infusion).   Denies abd pain, nausea, vomiting or diarrhea.  Defecation occurs once a day.  Weight remains stable and describes appetite as being excellent.  Has questions about proceeding with inguinal hernia repair after starting treatment for Crohn's disease.   Currently take pantoprazole as needed.  Experiences occasional LUQ, epigastric discomfort after eating.  Denies this being a daily complaint.    CTE on 08/18/2023: active inflammation with mild narrowing of 3 cm segment of TI, active inflammation with multiple skip lesions in distal, mid and proximal ileum/distal jejunum without signs of high grade obstruction, large left and moderate right hydrocele. Previously followed by Dr. Jose Pfeiffer (gastroenterologist in [facility]) until recently due to insurance change.  Colonoscopy was in April 2023: Mild erythema in sigmoid colon (benign), normal TI, small IH and poor prep. Colonoscopy on 06/13/2023:  Normal TI, mild erythema in sigmoid colon.  Biopsies from right and transverse colon were benign.  Left colon biopsies + for patchy active colitis.  EGD:  Reflux esophagitis (neg Cochran's), erosive gastritis (neg HP), normal duodenum (benign)  Crohn's history: Diagnosed with small and large bowel Crohn's as a freshman in college (2015). Presented with complaints of nausea, loss of appetite, and weight loss. Underwent colonoscopy and EGD in [place], [place]. Initially started on tapering steroids and oral mesalamine with little change in symptoms after 6 months of use. Has also tried sulfasalazine and Imuran in the past. Later switched to Remicade infusion at current rate of 10 mg/kg. Last EGD in August 2015: normal esophagus, moderate gastritis (neg HP), duodenal lesions (negative celiac, +chronic duodenitis).

## 2024-05-03 ENCOUNTER — OFFICE VISIT (OUTPATIENT)
Dept: URBAN - METROPOLITAN AREA CLINIC 88 | Facility: CLINIC | Age: 29
End: 2024-05-03

## 2024-05-03 RX ORDER — USTEKINUMAB 90 MG/ML
INJECT 1 PEN INJECTION, SOLUTION SUBCUTANEOUS
Status: ACTIVE | COMMUNITY

## 2024-05-03 RX ORDER — PANTOPRAZOLE SODIUM 40 MG/1
1 TABLET TABLET, DELAYED RELEASE ORAL
Status: ACTIVE | COMMUNITY

## 2024-05-13 ENCOUNTER — DASHBOARD ENCOUNTERS (OUTPATIENT)
Age: 29
End: 2024-05-13

## 2024-05-16 ENCOUNTER — OFFICE VISIT (OUTPATIENT)
Dept: URBAN - METROPOLITAN AREA CLINIC 88 | Facility: CLINIC | Age: 29
End: 2024-05-16

## 2024-05-16 RX ORDER — PANTOPRAZOLE SODIUM 40 MG/1
1 TABLET TABLET, DELAYED RELEASE ORAL
Status: ACTIVE | COMMUNITY

## 2024-05-16 RX ORDER — USTEKINUMAB 90 MG/ML
INJECT 1 PEN INJECTION, SOLUTION SUBCUTANEOUS
Status: ACTIVE | COMMUNITY

## 2024-06-24 ENCOUNTER — OFFICE VISIT (OUTPATIENT)
Dept: URBAN - METROPOLITAN AREA CLINIC 88 | Facility: CLINIC | Age: 29
End: 2024-06-24

## 2024-06-24 RX ORDER — USTEKINUMAB 90 MG/ML
INJECT 1 PEN INJECTION, SOLUTION SUBCUTANEOUS
Status: ACTIVE | COMMUNITY

## 2024-06-24 RX ORDER — PANTOPRAZOLE SODIUM 40 MG/1
1 TABLET TABLET, DELAYED RELEASE ORAL
Status: ACTIVE | COMMUNITY

## 2024-06-24 NOTE — HPI-TODAY'S VISIT:
28 y.o. with hx of Crohn's presenting for follow up.   CTE on 08/18/2023: active inflammation with mild narrowing of 3 cm segment of TI, active inflammation with multiple skip lesions in distal, mid and proximal ileum/distal jejunum without signs of high grade obstruction, large left and moderate right hydrocele. Previously followed by Dr. Jose Pfeiffer (gastroenterologist in [facility]) until recently due to insurance change.  Colonoscopy was in April 2023: Mild erythema in sigmoid colon (benign), normal TI, small IH and poor prep. Colonoscopy on 06/13/2023:  Normal TI, mild erythema in sigmoid colon.  Biopsies from right and transverse colon were benign.  Left colon biopsies + for patchy active colitis.  EGD:  Reflux esophagitis (neg Cochran's), erosive gastritis (neg HP), normal duodenum (benign).    Crohn's history: Diagnosed with small and large bowel Crohn's as a freshman in college (2015). Presented with complaints of nausea, loss of appetite, and weight loss. Underwent colonoscopy and EGD in [place], [place]. Initially started on tapering steroids and oral mesalamine with little change in symptoms after 6 months of use. Has also tried sulfasalazine and Imuran in the past. Later switched to Remicade infusion at current rate of 10 mg/kg. Last EGD in August 2015: normal esophagus, moderate gastritis (neg HP), duodenal lesions (negative celiac, +chronic duodenitis).

## 2024-06-24 NOTE — HPI-OTHER HISTORIES
--------------------------------------------------------------------------------- Office note 03/01/2024: 28 y.o. with hx of Crohn's presenting for follow up. Currently, on Stelara every 8 weeks (started October 2023). Despite taking medication, his inflammatory markers remain elevated. Continues to voice intermittent to constant pain to RLQ, especially after consuming larger meals. Massaging this area may relieve this pressure of sensation of blockage to this region. Defecation occurs about 2x a day, loose in consistency with urgency. Denies fecal incontinence or nocturnal urgency. Eating "clean" leads to less abdominal discomfort.  Weight loss remains an issue as well.  Last dose of Stelara around 02/16/2024.    CTE on 08/18/2023: active inflammation with mild narrowing of 3 cm segment of TI, active inflammation with multiple skip lesions in distal, mid and proximal ileum/distal jejunum without signs of high grade obstruction, large left and moderate right hydrocele. Previously followed by Dr. Jose Pfeiffer (gastroenterologist in [facility]) until recently due to insurance change.  Colonoscopy was in April 2023: Mild erythema in sigmoid colon (benign), normal TI, small IH and poor prep. Colonoscopy on 06/13/2023:  Normal TI, mild erythema in sigmoid colon.  Biopsies from right and transverse colon were benign.  Left colon biopsies + for patchy active colitis.  EGD:  Reflux esophagitis (neg Cochran's), erosive gastritis (neg HP), normal duodenum (benign)  Crohn's history: Diagnosed with small and large bowel Crohn's as a freshman in college (2015). Presented with complaints of nausea, loss of appetite, and weight loss. Underwent colonoscopy and EGD in [place], [place]. Initially started on tapering steroids and oral mesalamine with little change in symptoms after 6 months of use. Has also tried sulfasalazine and Imuran in the past. Later switched to Remicade infusion at current rate of 10 mg/kg. Last EGD in August 2015: normal esophagus, moderate gastritis (neg HP), duodenal lesions (negative celiac, +chronic duodenitis).

## 2024-07-10 ENCOUNTER — OFFICE VISIT (OUTPATIENT)
Dept: URBAN - METROPOLITAN AREA CLINIC 88 | Facility: CLINIC | Age: 29
End: 2024-07-10

## 2024-07-10 RX ORDER — PANTOPRAZOLE SODIUM 40 MG/1
1 TABLET TABLET, DELAYED RELEASE ORAL
Status: ACTIVE | COMMUNITY

## 2024-07-10 RX ORDER — USTEKINUMAB 90 MG/ML
INJECT 1 PEN INJECTION, SOLUTION SUBCUTANEOUS
Status: ACTIVE | COMMUNITY

## 2024-07-23 ENCOUNTER — OFFICE VISIT (OUTPATIENT)
Dept: URBAN - METROPOLITAN AREA CLINIC 88 | Facility: CLINIC | Age: 29
End: 2024-07-23

## 2024-07-23 RX ORDER — USTEKINUMAB 90 MG/ML
INJECT 1 PEN INJECTION, SOLUTION SUBCUTANEOUS
Status: ACTIVE | COMMUNITY

## 2024-07-23 RX ORDER — PANTOPRAZOLE SODIUM 40 MG/1
1 TABLET TABLET, DELAYED RELEASE ORAL
Status: ACTIVE | COMMUNITY

## 2024-07-24 ENCOUNTER — OFFICE VISIT (OUTPATIENT)
Dept: URBAN - METROPOLITAN AREA CLINIC 88 | Facility: CLINIC | Age: 29
End: 2024-07-24

## 2024-07-24 ENCOUNTER — LAB OUTSIDE AN ENCOUNTER (OUTPATIENT)
Dept: URBAN - METROPOLITAN AREA CLINIC 88 | Facility: CLINIC | Age: 29
End: 2024-07-24

## 2024-07-24 VITALS
HEART RATE: 90 BPM | BODY MASS INDEX: 22.46 KG/M2 | DIASTOLIC BLOOD PRESSURE: 69 MMHG | WEIGHT: 165.8 LBS | SYSTOLIC BLOOD PRESSURE: 112 MMHG | TEMPERATURE: 97.9 F | OXYGEN SATURATION: 99 % | HEIGHT: 72 IN

## 2024-07-24 PROBLEM — 266433003: Status: ACTIVE | Noted: 2024-07-24

## 2024-07-24 RX ORDER — PANTOPRAZOLE SODIUM 40 MG/1
1 TABLET TABLET, DELAYED RELEASE ORAL
Qty: 90 TABLET | Refills: 1

## 2024-07-24 RX ORDER — USTEKINUMAB 90 MG/ML
INJECT 1 PEN INJECTION, SOLUTION SUBCUTANEOUS
Status: ACTIVE | COMMUNITY

## 2024-07-24 RX ORDER — PANTOPRAZOLE SODIUM 40 MG/1
1 TABLET TABLET, DELAYED RELEASE ORAL
Status: ACTIVE | COMMUNITY

## 2024-07-24 RX ORDER — USTEKINUMAB 90 MG/ML
INJECT 1 PEN INJECTION, SOLUTION SUBCUTANEOUS
OUTPATIENT

## 2024-07-24 NOTE — HPI-OTHER HISTORIES
---------------------------------------------------------------- Office note  28 y.o. with hx of Crohn's presenting for follow up.   CTE on 08/18/2023: active inflammation with mild narrowing of 3 cm segment of TI, active inflammation with multiple skip lesions in distal, mid and proximal ileum/distal jejunum without signs of high grade obstruction, large left and moderate right hydrocele. Previously followed by Dr. Jose Pfeiffer (gastroenterologist in [facility]) until recently due to insurance change.  Colonoscopy was in April 2023: Mild erythema in sigmoid colon (benign), normal TI, small IH and poor prep. Colonoscopy on 06/13/2023:  Normal TI, mild erythema in sigmoid colon.  Biopsies from right and transverse colon were benign.  Left colon biopsies + for patchy active colitis.  EGD:  Reflux esophagitis (neg Cochran's), erosive gastritis (neg HP), normal duodenum (benign).    Crohn's history: Diagnosed with small and large bowel Crohn's as a freshman in college (2015). Presented with complaints of nausea, loss of appetite, and weight loss. Underwent colonoscopy and EGD in [place], [place]. Initially started on tapering steroids and oral mesalamine with little change in symptoms after 6 months of use. Has also tried sulfasalazine and Imuran in the past. Later switched to Remicade infusion at current rate of 10 mg/kg. Last EGD in August 2015: normal esophagus, moderate gastritis (neg HP), duodenal lesions (negative celiac, +chronic duodenitis).

## 2024-07-24 NOTE — HPI-TODAY'S VISIT:
29 y.o. with PMH of Crohn's disease presentting [facility] c/o epigastric abdominal pain that has been prsent for over 3 months.  Describes it as a sensation of fullness. Nausea is a daily complaint along with this.  States pain starts after 1st meal of day.    Abd pain occurs on daily basis.  Denies use of medication to improve symptoms.     Defecation occurs 1-2 times day with stools being semi-formed.    States last dose of Stelara over      CTE on 08/18/2023: active inflammation with mild narrowing of 3 cm segment of TI, active inflammation with multiple skip lesions in distal, mid and proximal ileum/distal jejunum without signs of high grade obstruction, large left and moderate right hydrocele.  Crohn's history: Diagnosed with small and large bowel Crohn's as a freshman in college (2015). Presented with complaints of nausea, loss of appetite, and weight loss. Underwent colonoscopy and EGD in [place], [place]. Initially started on tapering steroids and oral mesalamine with little change in symptoms after 6 months of use. Has also tried sulfasalazine and Imuran in the past. Later switched to Remicade infusion at current rate of 10 mg/kg. Last EGD in August 2015: normal esophagus, moderate gastritis (neg HP), duodenal lesions (negative celiac, +chronic duodenitis).

## 2024-08-06 ENCOUNTER — OFFICE VISIT (OUTPATIENT)
Dept: URBAN - METROPOLITAN AREA SURGERY CENTER 24 | Facility: SURGERY CENTER | Age: 29
End: 2024-08-06

## 2024-08-16 ENCOUNTER — CLAIMS CREATED FROM THE CLAIM WINDOW (OUTPATIENT)
Dept: URBAN - METROPOLITAN AREA CLINIC 4 | Facility: CLINIC | Age: 29
End: 2024-08-16
Payer: COMMERCIAL

## 2024-08-16 ENCOUNTER — CLAIMS CREATED FROM THE CLAIM WINDOW (OUTPATIENT)
Dept: URBAN - METROPOLITAN AREA SURGERY CENTER 24 | Facility: SURGERY CENTER | Age: 29
End: 2024-08-16
Payer: COMMERCIAL

## 2024-08-16 DIAGNOSIS — K31.89 OTHER DISEASES OF STOMACH AND DUODENUM: ICD-10-CM

## 2024-08-16 DIAGNOSIS — K21.9 ACID REFLUX: ICD-10-CM

## 2024-08-16 DIAGNOSIS — K50.90 ABDOMINAL PAIN DESPITE THERAPY FOR CROHN'S DISEASE: ICD-10-CM

## 2024-08-16 DIAGNOSIS — K31.7 BENIGN GASTRIC POLYP: ICD-10-CM

## 2024-08-16 DIAGNOSIS — K44.9 HIATAL HERNIA: ICD-10-CM

## 2024-08-16 DIAGNOSIS — K31.7 GASTRIC POLYPS: ICD-10-CM

## 2024-08-16 DIAGNOSIS — K29.60 ADENOPAPILLOMATOSIS GASTRICA: ICD-10-CM

## 2024-08-16 DIAGNOSIS — K29.70 GASTRITIS, UNSPECIFIED, WITHOUT BLEEDING: ICD-10-CM

## 2024-08-16 DIAGNOSIS — K21.9 GASTRO-ESOPHAGEAL REFLUX DISEASE WITHOUT ESOPHAGITIS: ICD-10-CM

## 2024-08-16 DIAGNOSIS — K31.7 POLYP OF STOMACH AND DUODENUM: ICD-10-CM

## 2024-08-16 PROCEDURE — 88305 TISSUE EXAM BY PATHOLOGIST: CPT | Performed by: PATHOLOGY

## 2024-08-16 PROCEDURE — 43239 EGD BIOPSY SINGLE/MULTIPLE: CPT | Performed by: INTERNAL MEDICINE

## 2024-08-16 PROCEDURE — 00731 ANES UPR GI NDSC PX NOS: CPT | Performed by: NURSE ANESTHETIST, CERTIFIED REGISTERED

## 2024-08-16 PROCEDURE — 88342 IMHCHEM/IMCYTCHM 1ST ANTB: CPT | Performed by: PATHOLOGY

## 2024-08-16 RX ORDER — PANTOPRAZOLE SODIUM 40 MG/1
1 TABLET TABLET, DELAYED RELEASE ORAL
Qty: 90 TABLET | Refills: 1 | Status: ACTIVE | COMMUNITY

## 2024-08-16 RX ORDER — USTEKINUMAB 90 MG/ML
INJECT 1 PEN INJECTION, SOLUTION SUBCUTANEOUS
Status: ACTIVE | COMMUNITY

## 2024-09-05 ENCOUNTER — OFFICE VISIT (OUTPATIENT)
Dept: URBAN - METROPOLITAN AREA CLINIC 88 | Facility: CLINIC | Age: 29
End: 2024-09-05

## 2024-09-05 RX ORDER — PANTOPRAZOLE SODIUM 40 MG/1
1 TABLET TABLET, DELAYED RELEASE ORAL
Qty: 90 TABLET | Refills: 1 | Status: ACTIVE | COMMUNITY

## 2024-09-05 RX ORDER — USTEKINUMAB 90 MG/ML
INJECT 1 PEN INJECTION, SOLUTION SUBCUTANEOUS
Status: ACTIVE | COMMUNITY

## 2024-09-16 ENCOUNTER — OFFICE VISIT (OUTPATIENT)
Dept: URBAN - METROPOLITAN AREA CLINIC 88 | Facility: CLINIC | Age: 29
End: 2024-09-16

## 2024-09-16 RX ORDER — USTEKINUMAB 90 MG/ML
INJECT 1 PEN INJECTION, SOLUTION SUBCUTANEOUS
Status: ACTIVE | COMMUNITY

## 2024-09-16 RX ORDER — PANTOPRAZOLE SODIUM 40 MG/1
1 TABLET TABLET, DELAYED RELEASE ORAL
Qty: 90 TABLET | Refills: 1 | Status: ACTIVE | COMMUNITY

## 2024-09-16 NOTE — HPI-OTHER HISTORIES
- - - - - - - - - - - - - - - - - - - - - - - - - - - - - Office note 07/24/2024: 29 y.o. with PMH of Crohn's disease presenting with c/o epigastric abdominal pain that has been present for over 3 months. Describes it as a sensation of fullness. Nausea is a daily complaint along with this. States pain starts after 1st meal of day. His abd pain is a daily complaint. Denies use of medication to improve symptoms. Defecation occurs 1-2 times day with stools being semi-formed. States last dose of Stelara over 9 weeks ago. Due to low drug levels, shorter interval of Stelara injection was prescribed. Depiste receiving infection every 4 weeks for over 2 months, his symptoms have remained the same. Reports weight loss, increasing fatigue and diarrhea. Last CTE on 08/18/2023: active inflammation with mild narrowing of 3 cm segment of TI, active inflammation with multiple skip lesions in distal, mid and proximal ileum/distal jejunum without signs of high grade obstruction, large left and moderate right hydrocele.  Previously followed by Dr. Jose Pfeiffer (gastroenterologist in [facility]) until 2023 due to insurance change. Colonoscopy was in April 2023: Mild erythema in sigmoid colon (benign), normal TI, small IH and poor prep. Colonoscopy on 06/13/2023: Normal TI, mild erythema in sigmoid colon. Biopsies from right and transverse colon were benign. Left colon biopsies + for patchy active colitis. EGD: Reflux esophagitis (neg Cochran's), erosive gastritis (neg HP), normal duodenum (benign).    Crohn's history: Diagnosed with small and large bowel Crohn's as a freshman in college (2015). Presented with complaints of nausea, loss of appetite, and weight loss. Underwent colonoscopy and EGD in [place], [place]. Initially started on tapering steroids and oral mesalamine with little change in symptoms after 6 months of use. Has also tried sulfasalazine and Imuran in the past. Later switched to Remicade infusion at current rate of 10 mg/kg. Last EGD in August 2015: normal esophagus, moderate gastritis (neg HP), duodenal lesions (negative celiac, +chronic duodenitis).

## 2024-10-01 ENCOUNTER — OFFICE VISIT (OUTPATIENT)
Dept: URBAN - METROPOLITAN AREA CLINIC 88 | Facility: CLINIC | Age: 29
End: 2024-10-01
Payer: COMMERCIAL

## 2024-10-01 ENCOUNTER — LAB OUTSIDE AN ENCOUNTER (OUTPATIENT)
Dept: URBAN - METROPOLITAN AREA CLINIC 88 | Facility: CLINIC | Age: 29
End: 2024-10-01

## 2024-10-01 VITALS
HEART RATE: 54 BPM | SYSTOLIC BLOOD PRESSURE: 118 MMHG | TEMPERATURE: 97.9 F | HEIGHT: 72 IN | WEIGHT: 159.8 LBS | BODY MASS INDEX: 21.64 KG/M2 | DIASTOLIC BLOOD PRESSURE: 69 MMHG | OXYGEN SATURATION: 100 %

## 2024-10-01 DIAGNOSIS — K21.00 GASTROESOPHAGEAL REFLUX DISEASE WITH ESOPHAGITIS WITHOUT HEMORRHAGE: ICD-10-CM

## 2024-10-01 DIAGNOSIS — Z91.199 NON-COMPLIANCE WITH TREATMENT: ICD-10-CM

## 2024-10-01 DIAGNOSIS — R10.13 EPIGASTRIC ABDOMINAL PAIN: ICD-10-CM

## 2024-10-01 DIAGNOSIS — K50.80 CROHN'S DISEASE OF BOTH SMALL AND LARGE INTESTINE WITHOUT COMPLICATION: ICD-10-CM

## 2024-10-01 PROCEDURE — 99214 OFFICE O/P EST MOD 30 MIN: CPT | Performed by: NURSE PRACTITIONER

## 2024-10-01 RX ORDER — USTEKINUMAB 90 MG/ML
INJECT 1 PEN INJECTION, SOLUTION SUBCUTANEOUS
OUTPATIENT

## 2024-10-01 RX ORDER — PANTOPRAZOLE SODIUM 40 MG/1
1 TABLET TABLET, DELAYED RELEASE ORAL
Qty: 90 TABLET | Refills: 1 | Status: ACTIVE | COMMUNITY

## 2024-10-01 RX ORDER — USTEKINUMAB 90 MG/ML
INJECT 1 PEN INJECTION, SOLUTION SUBCUTANEOUS
Status: ACTIVE | COMMUNITY

## 2024-10-01 RX ORDER — UPADACITINIB 45 MG/1
TAKE 1 TABLET TABLET, EXTENDED RELEASE ORAL
Qty: 90 | Refills: 0 | OUTPATIENT
Start: 2024-10-01 | End: 2025-01-04

## 2024-10-01 RX ORDER — UPADACITINIB 30 MG/1
1 TABLET TABLET, EXTENDED RELEASE ORAL ONCE A DAY
Qty: 90 TABLET | Refills: 3 | OUTPATIENT
Start: 2024-10-01 | End: 2025-10-02

## 2024-10-01 RX ORDER — PANTOPRAZOLE SODIUM 40 MG/1
1 TABLET TABLET, DELAYED RELEASE ORAL
Qty: 90 TABLET | Refills: 1

## 2024-10-01 NOTE — HPI-OTHER HISTORIES
- - - - - - - - - - - - - - - - - - - - - - - - - - - - - Office note 07/24/2024: 29 y.o. with PMH of Crohn's disease presenting with c/o epigastric abdominal pain that has been present for over 3 months. Describes it as a sensation of fullness. Nausea is a daily complaint along with this. States pain starts after 1st meal of day. His abd pain is a daily complaint. Denies use of medication to improve symptoms. Defecation occurs 1-2 times day with stools being semi-formed. States last dose of Stelara over 9 weeks ago. Due to low drug levels, shorter interval of Stelara injection was prescribed. Depiste receiving infection every 4 weeks for over 2 months, his symptoms have remained the same. Reports weight loss, increasing fatigue and diarrhea. Last CTE on 08/18/2023: active inflammation with mild narrowing of 3 cm segment of TI, active inflammation with multiple skip lesions in distal, mid and proximal ileum/distal jejunum without signs of high grade obstruction, large left and moderate right hydrocele.  Previously followed by Dr. Jose Pfeiffer (gastroenterologist in [facility]) until 2023 due to insurance change. Colonoscopy was in April 2023: Mild erythema in sigmoid colon (benign), normal TI, small IH and poor prep. Colonoscopy on 06/13/2023: Normal TI, mild erythema in sigmoid colon. Biopsies from right and transverse colon were benign. Left colon biopsies + for patchy active colitis. EGD: Reflux esophagitis (neg Cochran's), erosive gastritis (neg HP), normal duodenum (benign).    Crohn's history: Diagnosed with small and large bowel Crohn's as a freshman in college (2015). Presented with complaints of nausea, loss of appetite, and weight loss. Underwent colonoscopy and EGD in [place], [place]. Initially started on tapering steroids and oral mesalamine with little change in symptoms after 6 months of use. Has also tried sulfasalazine and Imuran in the past. Later switched to Remicade infusion at current rate of 10 mg/kg. Last EGD in August 2015: normal esophagus, moderate gastritis (neg HP), duodenal lesions (negative celiac, +chronic duodenitis).

## 2024-10-01 NOTE — HPI-TODAY'S VISIT:
Patient with hx of Crohn's disease (affecting large and small bowels) presents today for follow up.  Underwent EGD by Dr. Berta Elliott on 08/16/2024.  EGD revealed reflux esophagitis (neg Cochran's/EoE), small hiatal hernia, gastritis (neg h. pylori), fundic gland polyps and normal duodenum (benign).     Regarding Crohn's disease, he was prescribed Stelara every 8 weeks.  He admits today that he has been off medication since March 2024 due to change in pharmacy (which patient nor our office were aware of).   Defecation occurs on once a day that he describes as being "explosive".  Eating leads to increase abdominal pain especially to RLQ.  Weight loss continues.    Underwent bilateral hydrocele repair 2 weeks ago.  Previously followed by Dr. Jose Pfeiffer (gastroenterologist in [facility]) until 2023 due to insurance change. Colonoscopy was in April 2023: Mild erythema in sigmoid colon (benign), normal TI, small IH and poor prep. Colonoscopy on 06/13/2023: Normal TI, mild erythema in sigmoid colon. Biopsies from right and transverse colon were benign. Left colon biopsies + for patchy active colitis. EGD: Reflux esophagitis (neg Cochran's), erosive gastritis (neg HP), normal duodenum (benign).  Crohn's history: Diagnosed with small and large bowel Crohn's as a freshman in college (2015). Presented with complaints of nausea, loss of appetite, and weight loss. Underwent colonoscopy and EGD in [place], [place]. Initially started on tapering steroids and oral mesalamine with little change in symptoms after 6 months of use. Has also tried sulfasalazine and Imuran in the past. Later switched to Remicade infusion at current rate of 10 mg/kg. Last EGD in August 2015: normal esophagus, moderate gastritis (neg HP), duodenal lesions (negative celiac, +chronic duodenitis).d

## 2024-10-08 ENCOUNTER — TELEPHONE ENCOUNTER (OUTPATIENT)
Dept: URBAN - METROPOLITAN AREA CLINIC 88 | Facility: CLINIC | Age: 29
End: 2024-10-08

## 2024-10-09 ENCOUNTER — TELEPHONE ENCOUNTER (OUTPATIENT)
Dept: URBAN - METROPOLITAN AREA CLINIC 88 | Facility: CLINIC | Age: 29
End: 2024-10-09

## 2024-10-23 ENCOUNTER — TELEPHONE ENCOUNTER (OUTPATIENT)
Dept: URBAN - METROPOLITAN AREA CLINIC 88 | Facility: CLINIC | Age: 29
End: 2024-10-23

## 2024-11-13 ENCOUNTER — OFFICE VISIT (OUTPATIENT)
Dept: URBAN - METROPOLITAN AREA CLINIC 88 | Facility: CLINIC | Age: 29
End: 2024-11-13

## 2024-11-13 RX ORDER — UPADACITINIB 30 MG/1
1 TABLET TABLET, EXTENDED RELEASE ORAL ONCE A DAY
Qty: 90 TABLET | Refills: 3 | Status: ACTIVE | COMMUNITY
Start: 2024-10-01 | End: 2025-10-02

## 2024-11-13 RX ORDER — UPADACITINIB 45 MG/1
TAKE 1 TABLET TABLET, EXTENDED RELEASE ORAL
Qty: 90 | Refills: 0 | Status: ACTIVE | COMMUNITY
Start: 2024-10-01 | End: 2025-01-04

## 2024-11-13 RX ORDER — PANTOPRAZOLE SODIUM 40 MG/1
1 TABLET TABLET, DELAYED RELEASE ORAL
Qty: 90 TABLET | Refills: 1 | Status: ACTIVE | COMMUNITY

## 2024-11-13 RX ORDER — USTEKINUMAB 90 MG/ML
INJECT 1 PEN INJECTION, SOLUTION SUBCUTANEOUS
Status: ACTIVE | COMMUNITY

## 2024-12-13 ENCOUNTER — OFFICE VISIT (OUTPATIENT)
Dept: URBAN - METROPOLITAN AREA CLINIC 88 | Facility: CLINIC | Age: 29
End: 2024-12-13

## 2024-12-13 RX ORDER — PANTOPRAZOLE SODIUM 40 MG/1
1 TABLET TABLET, DELAYED RELEASE ORAL
Qty: 90 TABLET | Refills: 1 | Status: ACTIVE | COMMUNITY

## 2024-12-13 RX ORDER — USTEKINUMAB 90 MG/ML
INJECT 1 PEN INJECTION, SOLUTION SUBCUTANEOUS
Status: ACTIVE | COMMUNITY

## 2024-12-13 RX ORDER — UPADACITINIB 45 MG/1
TAKE 1 TABLET TABLET, EXTENDED RELEASE ORAL
Qty: 90 | Refills: 0 | Status: ACTIVE | COMMUNITY
Start: 2024-10-01 | End: 2025-01-04

## 2024-12-13 RX ORDER — UPADACITINIB 30 MG/1
1 TABLET TABLET, EXTENDED RELEASE ORAL ONCE A DAY
Qty: 90 TABLET | Refills: 3 | Status: ACTIVE | COMMUNITY
Start: 2024-10-01 | End: 2025-10-02

## 2024-12-16 ENCOUNTER — OFFICE VISIT (OUTPATIENT)
Dept: URBAN - METROPOLITAN AREA CLINIC 88 | Facility: CLINIC | Age: 29
End: 2024-12-16

## 2025-01-23 ENCOUNTER — TELEPHONE ENCOUNTER (OUTPATIENT)
Dept: URBAN - METROPOLITAN AREA CLINIC 88 | Facility: CLINIC | Age: 30
End: 2025-01-23

## 2025-01-24 ENCOUNTER — WEB ENCOUNTER (OUTPATIENT)
Dept: URBAN - METROPOLITAN AREA CLINIC 88 | Facility: CLINIC | Age: 30
End: 2025-01-24

## 2025-01-30 ENCOUNTER — OFFICE VISIT (OUTPATIENT)
Dept: URBAN - METROPOLITAN AREA CLINIC 88 | Facility: CLINIC | Age: 30
End: 2025-01-30
Payer: COMMERCIAL

## 2025-01-30 VITALS
DIASTOLIC BLOOD PRESSURE: 67 MMHG | BODY MASS INDEX: 19.96 KG/M2 | HEIGHT: 72 IN | WEIGHT: 147.4 LBS | OXYGEN SATURATION: 100 % | SYSTOLIC BLOOD PRESSURE: 110 MMHG | TEMPERATURE: 98.4 F | HEART RATE: 62 BPM

## 2025-01-30 DIAGNOSIS — Z91.199 NON-COMPLIANCE WITH TREATMENT: ICD-10-CM

## 2025-01-30 DIAGNOSIS — K50.80 CROHN'S DISEASE OF BOTH SMALL AND LARGE INTESTINE WITHOUT COMPLICATION: ICD-10-CM

## 2025-01-30 DIAGNOSIS — K21.00 GASTROESOPHAGEAL REFLUX DISEASE WITH ESOPHAGITIS WITHOUT HEMORRHAGE: ICD-10-CM

## 2025-01-30 DIAGNOSIS — K59.01 SLOW TRANSIT CONSTIPATION: ICD-10-CM

## 2025-01-30 PROBLEM — 35298007: Status: ACTIVE | Noted: 2025-01-30

## 2025-01-30 PROCEDURE — 99214 OFFICE O/P EST MOD 30 MIN: CPT | Performed by: NURSE PRACTITIONER

## 2025-01-30 RX ORDER — USTEKINUMAB 90 MG/ML
INJECT 1 PEN INJECTION, SOLUTION SUBCUTANEOUS
Status: DISCONTINUED | COMMUNITY

## 2025-01-30 RX ORDER — UPADACITINIB 30 MG/1
1 TABLET TABLET, EXTENDED RELEASE ORAL ONCE A DAY
Qty: 90 TABLET | Refills: 3 | Status: ACTIVE | COMMUNITY
Start: 2024-10-01 | End: 2025-10-02

## 2025-01-30 RX ORDER — UPADACITINIB 30 MG/1
1 TABLET TABLET, EXTENDED RELEASE ORAL ONCE A DAY
OUTPATIENT
Start: 2024-10-01

## 2025-01-30 RX ORDER — PANTOPRAZOLE SODIUM 40 MG/1
1 TABLET TABLET, DELAYED RELEASE ORAL
Qty: 90 TABLET | Refills: 1 | Status: ACTIVE | COMMUNITY

## 2025-01-30 RX ORDER — PANTOPRAZOLE SODIUM 40 MG/1
1 TABLET TABLET, DELAYED RELEASE ORAL
OUTPATIENT

## 2025-01-30 RX ORDER — LACTULOSE 10 G/15ML
15 ML AS NEEDED SOLUTION ORAL
Qty: 900 ML | Refills: 5 | OUTPATIENT
Start: 2025-01-30 | End: 2025-07-29

## 2025-01-30 NOTE — HPI-TODAY'S VISIT:
29 y.o. with hx of small and large bowel Crohn's disease presenting with c/o constipation.  He started to experience onset of constipation on 01/13/2025.   States he stopped having a BM on this day with gradual onset of bloating.  Due to increasing abdominal bloating, abdominal pain and constipation drank a bottle of magnesium citrate.  This lead to increase back and abdominal pain.  Evaluated in ED at [facility] (records unavailable for review) and was informed of having normal abdominal x-ray.  States IV pain medication (morphine) and steroids were administered during this visit, and he was discharged home.  However, constipation continued. States he experienced mild degree of flatulence.  Denies passage of stool, nausea or vomiting. Started fast but avoiding solids and adhering to liquids only.  Due to this intermittent fasting and complaints, he has lost 12 lbs since LOV. .  Contacted our office on 01/23/2025 due to continued complaints of constipation.  States it has been 2-3 weeks since having a BM.  Due to concern of SBO given hx of Crohn's, instructed to go to [facility] in [place] for evaluation.  Presented to [facility]'s ED on 01/27/2025.  Abd x-ray revealed mild to moderate constipation w/o signs of bowel obstruction.  Labs:  WBC 5.29, Hgb 9.7, MCV 88, , and Lipase 53.  Discharged home with instructions to start MiraLAX daily.   At this time, he is passing flatulence (though small amounts).    Due to lack of remission, switched from Stelara to Rinvoq in October 2024.  States he is currently on 30 mg/day maintenance dose of Rinvoq.  Experienced formed stools and daily BMs during the first 3-4 weeks of starting medication before onset of constipation.    Stool studies were ordered at LOV to evalaute degree of mucosal inflammation.  Patient has not completed test as of today.  Last CTE was in 2023 with active SB Crohn's noted given long segment of skip lesions involving mid to distal ileum w/o signs of high grade obstruction or perianal disease.     Crohn's History: Diagnosed with small and large bowel Crohn's as a freshman in college (2015). Presented with complaints of nausea, loss of appetite, and weight loss. Underwent colonoscopy and EGD in [place], [place]. Initially started on tapering steroids and oral mesalamine with little change in symptoms after 6 months of use. Has also tried sulfasalazine and Imuran in the past. Later switched to Remicade infusion at current rate of 10 mg/kg. Last EGD in August 2015: normal esophagus, moderate gastritis (neg HP), duodenal lesions (negative celiac, +chronic duodenitis).  Started on Stelara in October 2023.  Remission was not fully achieved.  He admitted to poor compliance with medication.  Given history, he was switched to Rinvoq in October 2024.

## 2025-01-30 NOTE — HPI-OTHER HISTORIES
- - - - - - - - - - - - - - - - - - - - - - - - - - - - - - - - - - - - -- Office note 10/01/2024: Patient with hx of Crohn's disease (affecting large and small bowels) presents today for follow up. Underwent EGD by Dr. Berta Elliott on 08/16/2024. EGD revealed reflux esophagitis (neg Cochran's/EoE), small hiatal hernia, gastritis (neg h. pylori), fundic gland polyps and normal duodenum (benign).   Regarding Crohn's disease, he was prescribed Stelara every 8 weeks. He admits today that he has been off medication since March 2024 due to change in pharmacy (which patient nor our office were aware of). Defecation occurs on once a day that he describes as being "explosive". Eating leads to increase abdominal pain especially to RLQ. Weight loss continues.  Underwent bilateral hydrocele repair 2 weeks ago.  Previously followed by Dr. Jose Pfeiffer (gastroenterologist in [facility]) until 2023 due to insurance change. Colonoscopy was in April 2023: Mild erythema in sigmoid colon (benign), normal TI, small IH and poor prep. Colonoscopy on 06/13/2023: Normal TI, mild erythema in sigmoid colon. Biopsies from right and transverse colon were benign. Left colon biopsies + for patchy active colitis. EGD: Reflux esophagitis (neg Cochran's), erosive gastritis (neg HP), normal duodenum (benign).  Crohn's history: Diagnosed with small and large bowel Crohn's as a freshman in college (2015). Presented with complaints of nausea, loss of appetite, and weight loss. Underwent colonoscopy and EGD in [place], [place]. Initially started on tapering steroids and oral mesalamine with little change in symptoms after 6 months of use. Has also tried sulfasalazine and Imuran in the past. Later switched to Remicade infusion at current rate of 10 mg/kg. Last EGD in August 2015: normal esophagus, moderate gastritis (neg HP), duodenal lesions (negative celiac, +chronic duodenitis).

## 2025-02-18 ENCOUNTER — OFFICE VISIT (OUTPATIENT)
Dept: URBAN - METROPOLITAN AREA CLINIC 88 | Facility: CLINIC | Age: 30
End: 2025-02-18

## 2025-02-18 RX ORDER — LACTULOSE 10 G/15ML
15 ML AS NEEDED SOLUTION ORAL
Qty: 900 ML | Refills: 5 | Status: ACTIVE | COMMUNITY
Start: 2025-01-30 | End: 2025-07-29

## 2025-02-18 RX ORDER — UPADACITINIB 30 MG/1
1 TABLET TABLET, EXTENDED RELEASE ORAL ONCE A DAY
Status: ACTIVE | COMMUNITY
Start: 2024-10-01

## 2025-02-18 RX ORDER — PANTOPRAZOLE SODIUM 40 MG/1
1 TABLET TABLET, DELAYED RELEASE ORAL
Status: ACTIVE | COMMUNITY

## 2025-07-29 ENCOUNTER — TELEPHONE ENCOUNTER (OUTPATIENT)
Dept: URBAN - METROPOLITAN AREA CLINIC 70 | Facility: CLINIC | Age: 30
End: 2025-07-29

## 2025-08-13 ENCOUNTER — OFFICE VISIT (OUTPATIENT)
Dept: URBAN - METROPOLITAN AREA CLINIC 88 | Facility: CLINIC | Age: 30
End: 2025-08-13

## 2025-08-13 RX ORDER — UPADACITINIB 30 MG/1
1 TABLET TABLET, EXTENDED RELEASE ORAL ONCE A DAY
Status: ACTIVE | COMMUNITY
Start: 2024-10-01

## 2025-08-13 RX ORDER — PANTOPRAZOLE SODIUM 40 MG/1
1 TABLET TABLET, DELAYED RELEASE ORAL
Status: ACTIVE | COMMUNITY